# Patient Record
Sex: MALE | URBAN - METROPOLITAN AREA
[De-identification: names, ages, dates, MRNs, and addresses within clinical notes are randomized per-mention and may not be internally consistent; named-entity substitution may affect disease eponyms.]

---

## 2023-06-14 ENCOUNTER — TELEPHONE (OUTPATIENT)
Dept: UROLOGY | Facility: AMBULATORY SURGERY CENTER | Age: 63
End: 2023-06-14

## 2023-06-24 PROBLEM — N40.1 BENIGN PROSTATIC HYPERPLASIA WITH URINARY HESITANCY: Status: ACTIVE | Noted: 2023-06-24

## 2023-06-24 PROBLEM — N48.6 PEYRONIE'S DISEASE: Status: ACTIVE | Noted: 2023-06-24

## 2023-06-24 PROBLEM — R39.11 BENIGN PROSTATIC HYPERPLASIA WITH URINARY HESITANCY: Status: ACTIVE | Noted: 2023-06-24

## 2023-06-24 PROBLEM — R39.198 SLOW URINARY STREAM: Status: ACTIVE | Noted: 2023-06-24

## 2023-06-24 NOTE — PROGRESS NOTES
UROLOGY PROGRESS NOTE         NAME: Tim Quigley  AGE: 70 y o  SEX: male  : 1960   MRN: [id number]    DATE: 2023  TIME: 1:47 PM    Assessment and Plan   Procedures     Impression:   1  Benign prostatic hyperplasia with urinary hesitancy    2  Slow urinary stream    3  Peyronie's disease    4  Groin rash         Plan: Stop the Flomax continue Proscar only restart Flomax if needed  For the left groin fungal infection we will get him Mycolog-II twice a day for 3 weeks  The patient chart states allergy to prednisone but I told the patient with this being a topical solution there will be no appreciable absorption into the bloodstream to cause any those side effects  He understands and agrees    Follow-up in 1 year      Chief Complaint   No chief complaint on file  History of Present Illness     HPI: Tim Quigley is a 70y o  year old male who presents with urological care follow-up transfer from [facility] to [place] [id number] urology  Patient last seen by me on 2022 history of BPH on Flomax and Proscar  I had met him in 2019 for second opinion after being seen at [facility] where he was recommended a TURP for BPH  His main complaint was start and stop of stream   I talked him about a UroLift and other minimally invasive surgical options  He decided to stay on Flomax and Proscar  Also with a history of Peyronie's disease  And patient was to decide whether to consider UroLift in the future  States his PSA was 1 4 in 2022 his recent PSA 2023 was 1 1 stable      Patient currently doing well from a urinary standpoint  Again we talked about stopping Flomax and staying just on the finasteride he is going to give that a try  He noticed that his left groin had an open area and he has been using nystatin periodically but is looking for more permanent solution for that  He still has some mild Peyronie's disease but no ED issues            The following "portions of the patient's history were reviewed and updated as appropriate: allergies, current medications, past family history, past medical history, past social history, past surgical history and problem list   History reviewed  No pertinent past medical history  Past Surgical History:   Procedure Laterality Date   • KNEE ARTHROPLASTY       shoulder  Review of Systems     Const: Denies chills, fever and weight loss  CV: Denies chest pain  Resp: Denies SOB  GI: Denies abdominal pain, nausea and vomiting  : Denies symptoms other than stated above  Musculo: Denies back pain  Objective   /78 (BP Location: Right arm, Patient Position: Sitting, Cuff Size: Standard)   Pulse 65   Temp (!) 97 2 °F (36 2 °C)   Ht 5' 11\" (1 803 m)   Wt 87 5 kg (193 lb)   SpO2 97%   BMI 26 92 kg/m²     Physical Exam  Const: Appears healthy and well developed  No signs of acute distress present  Resp: Respirations are regular and unlabored  CV: Rate is regular  Rhythm is regular  Abdomen: Abdomen is soft, nontender, and nondistended  Kidneys are not palpable  : Had a normal external genitalia exam in the mid shaft there was some scarring consistent with Peyronie's  In the left groin there is no open area now but it looks a little excoriated and erythematous  No sign of infection or or pus  His prostate was benign feeling and smooth to slightly enlarged  Psych: Patient's attitude is cooperative   Mood is normal  Affect is normal     Current Medications     Current Outpatient Medications:   •  ascorbic Acid (VITAMIN C) 500 MG CPCR, , Disp: , Rfl:   •  aspirin (ECOTRIN LOW STRENGTH) 81 mg EC tablet, Take 81 mg by mouth, Disp: , Rfl:   •  B Complex Vitamins (Vitamin B Complex) TABS, Take 1 tablet by mouth daily, Disp: , Rfl:   •  Calcium Carb-Cholecalciferol 600-25 MG-MCG CAPS, Take by mouth, Disp: , Rfl:   •  celecoxib (CeleBREX) 200 mg capsule, Take 200 mg by mouth, Disp: , Rfl:   •  co-enzyme Q-10 100 mg capsule, " Take by mouth, Disp: , Rfl:   •  finasteride (PROSCAR) 5 mg tablet, Every 12 hours, Disp: , Rfl:   •  isosorbide mononitrate (IMDUR) 30 mg 24 hr tablet, every 24 hours, Disp: , Rfl:   •  lisinopril (ZESTRIL) 5 mg tablet, Take 5 mg by mouth daily, Disp: , Rfl:   •  loratadine (Claritin) 10 mg tablet, 10 mg, Disp: , Rfl:   •  losartan (COZAAR) 25 mg tablet, Take 25 mg by mouth daily, Disp: , Rfl:   •  mometasone (NASONEX) 50 mcg/act nasal spray, every 24 hours, Disp: , Rfl:   •  Omega-3 Fatty Acids (fish oil) 1,000 mg, , Disp: , Rfl:   •  RABEprazole (ACIPHEX) 20 MG tablet, every 24 hours, Disp: , Rfl:   •  rosuvastatin (CRESTOR) 10 MG tablet, every 24 hours, Disp: , Rfl:   •  tamsulosin (FLOMAX) 0 4 mg, 1 capsule every 24 hours, Disp: , Rfl:   •  metoprolol tartrate (LOPRESSOR) 25 mg tablet, Take 25 mg by mouth 2 (two) times a day (Patient not taking: Reported on 6/28/2023), Disp: , Rfl:         Marcia Roca MD

## 2023-06-26 RX ORDER — LORATADINE 10 MG/1
10 TABLET ORAL
COMMUNITY
Start: 2023-05-16

## 2023-06-26 RX ORDER — MOMETASONE FUROATE 50 UG/1
SPRAY, METERED NASAL EVERY 24 HOURS
COMMUNITY
Start: 2023-03-10

## 2023-06-26 RX ORDER — LOSARTAN POTASSIUM 25 MG/1
25 TABLET ORAL DAILY
COMMUNITY
Start: 2023-04-25

## 2023-06-26 RX ORDER — ASPIRIN 81 MG/1
81 TABLET ORAL
COMMUNITY

## 2023-06-26 RX ORDER — LISINOPRIL 5 MG/1
5 TABLET ORAL DAILY
COMMUNITY

## 2023-06-26 RX ORDER — RABEPRAZOLE SODIUM 20 MG/1
TABLET, DELAYED RELEASE ORAL EVERY 24 HOURS
COMMUNITY

## 2023-06-26 RX ORDER — ROSUVASTATIN CALCIUM 10 MG/1
TABLET, COATED ORAL EVERY 24 HOURS
COMMUNITY

## 2023-06-26 RX ORDER — CELECOXIB 200 MG/1
200 CAPSULE ORAL
COMMUNITY

## 2023-06-26 RX ORDER — B-COMPLEX WITH VITAMIN C
1 TABLET ORAL DAILY
COMMUNITY

## 2023-06-26 RX ORDER — ISOSORBIDE MONONITRATE 30 MG/1
TABLET, EXTENDED RELEASE ORAL EVERY 24 HOURS
COMMUNITY

## 2023-06-26 RX ORDER — UBIDECARENONE 100 MG
CAPSULE ORAL
COMMUNITY

## 2023-06-26 RX ORDER — CHLORAL HYDRATE 500 MG
CAPSULE ORAL
COMMUNITY

## 2023-06-26 RX ORDER — FINASTERIDE 5 MG/1
TABLET, FILM COATED ORAL EVERY 12 HOURS
COMMUNITY

## 2023-06-26 RX ORDER — TAMSULOSIN HYDROCHLORIDE 0.4 MG/1
1 CAPSULE ORAL EVERY 24 HOURS
COMMUNITY

## 2023-06-28 ENCOUNTER — OFFICE VISIT (OUTPATIENT)
Dept: UROLOGY | Facility: CLINIC | Age: 63
End: 2023-06-28
Payer: MEDICARE

## 2023-06-28 VITALS
BODY MASS INDEX: 27.02 KG/M2 | SYSTOLIC BLOOD PRESSURE: 124 MMHG | HEART RATE: 65 BPM | WEIGHT: 193 LBS | DIASTOLIC BLOOD PRESSURE: 78 MMHG | OXYGEN SATURATION: 97 % | HEIGHT: 71 IN | TEMPERATURE: 97.2 F

## 2023-06-28 DIAGNOSIS — R39.11 BENIGN PROSTATIC HYPERPLASIA WITH URINARY HESITANCY: Primary | ICD-10-CM

## 2023-06-28 DIAGNOSIS — R39.198 SLOW URINARY STREAM: ICD-10-CM

## 2023-06-28 DIAGNOSIS — R21 GROIN RASH: ICD-10-CM

## 2023-06-28 DIAGNOSIS — N40.1 BENIGN PROSTATIC HYPERPLASIA WITH URINARY HESITANCY: Primary | ICD-10-CM

## 2023-06-28 DIAGNOSIS — N48.6 PEYRONIE'S DISEASE: ICD-10-CM

## 2023-06-28 RX ORDER — NYSTATIN AND TRIAMCINOLONE ACETONIDE 100000; 1 [USP'U]/G; MG/G
OINTMENT TOPICAL 2 TIMES DAILY
Qty: 30 G | Refills: 1 | Status: SHIPPED | OUTPATIENT
Start: 2023-06-28

## 2023-08-21 DIAGNOSIS — N40.1 BENIGN PROSTATIC HYPERPLASIA WITH URINARY HESITANCY: Primary | ICD-10-CM

## 2023-08-21 DIAGNOSIS — R39.11 BENIGN PROSTATIC HYPERPLASIA WITH URINARY HESITANCY: Primary | ICD-10-CM

## 2023-08-21 RX ORDER — FINASTERIDE 5 MG/1
5 TABLET, FILM COATED ORAL DAILY
Qty: 90 TABLET | Refills: 0 | Status: SHIPPED | OUTPATIENT
Start: 2023-08-21

## 2023-08-21 RX ORDER — TAMSULOSIN HYDROCHLORIDE 0.4 MG/1
0.4 CAPSULE ORAL EVERY 24 HOURS
Qty: 90 CAPSULE | Refills: 0 | Status: SHIPPED | OUTPATIENT
Start: 2023-08-21

## 2023-08-21 NOTE — TELEPHONE ENCOUNTER
Medication Tamsulosin/Finasteride  Quantity    Pharmacy [facility]    PCP/Speciality Urology    Enough for 3 days -Yes

## 2023-10-29 DIAGNOSIS — R39.11 BENIGN PROSTATIC HYPERPLASIA WITH URINARY HESITANCY: ICD-10-CM

## 2023-10-29 DIAGNOSIS — N40.1 BENIGN PROSTATIC HYPERPLASIA WITH URINARY HESITANCY: ICD-10-CM

## 2023-10-30 RX ORDER — FINASTERIDE 5 MG/1
5 TABLET, FILM COATED ORAL DAILY
Qty: 90 TABLET | Refills: 0 | Status: SHIPPED | OUTPATIENT
Start: 2023-10-30

## 2023-12-06 DIAGNOSIS — R39.11 BENIGN PROSTATIC HYPERPLASIA WITH URINARY HESITANCY: ICD-10-CM

## 2023-12-06 DIAGNOSIS — N40.1 BENIGN PROSTATIC HYPERPLASIA WITH URINARY HESITANCY: ICD-10-CM

## 2023-12-06 RX ORDER — TAMSULOSIN HYDROCHLORIDE 0.4 MG/1
CAPSULE ORAL
Qty: 90 CAPSULE | Refills: 0 | Status: SHIPPED | OUTPATIENT
Start: 2023-12-06

## 2024-01-15 DIAGNOSIS — N40.1 BENIGN PROSTATIC HYPERPLASIA WITH URINARY HESITANCY: ICD-10-CM

## 2024-01-15 DIAGNOSIS — R39.11 BENIGN PROSTATIC HYPERPLASIA WITH URINARY HESITANCY: ICD-10-CM

## 2024-01-15 RX ORDER — TAMSULOSIN HYDROCHLORIDE 0.4 MG/1
0.4 CAPSULE ORAL
Qty: 90 CAPSULE | Refills: 0 | Status: SHIPPED | OUTPATIENT
Start: 2024-01-15

## 2024-01-15 RX ORDER — FINASTERIDE 5 MG/1
5 TABLET, FILM COATED ORAL DAILY
Qty: 90 TABLET | Refills: 0 | Status: SHIPPED | OUTPATIENT
Start: 2024-01-15

## 2024-01-15 NOTE — TELEPHONE ENCOUNTER
Reason for call:   [x] Refill   [] Prior Auth  [] Other:     Office:   [] PCP/Provider -   [x] Specialty/Provider - Urology    Medication:   finasteride (PROSCAR) 5 mg tablet   tamsulosin (FLOMAX) 0.4 mg     Dose/Frequency:   5 mg, Oral, Daily   TAKE 1 CAPSULE BY MOUTH EVERY 24 HOURS     Quantity: 90  90    Pharmacy: [facility]    Does the patient have enough for 3 days?   [x] Yes   [] No - Send as HP to POD

## 2024-04-13 DIAGNOSIS — N40.1 BENIGN PROSTATIC HYPERPLASIA WITH URINARY HESITANCY: ICD-10-CM

## 2024-04-13 DIAGNOSIS — R39.11 BENIGN PROSTATIC HYPERPLASIA WITH URINARY HESITANCY: ICD-10-CM

## 2024-04-15 RX ORDER — FINASTERIDE 5 MG/1
5 TABLET, FILM COATED ORAL DAILY
Qty: 90 TABLET | Refills: 1 | Status: SHIPPED | OUTPATIENT
Start: 2024-04-15

## 2024-05-28 DIAGNOSIS — N40.1 BENIGN PROSTATIC HYPERPLASIA WITH URINARY HESITANCY: ICD-10-CM

## 2024-05-28 DIAGNOSIS — R39.11 BENIGN PROSTATIC HYPERPLASIA WITH URINARY HESITANCY: ICD-10-CM

## 2024-05-28 RX ORDER — TAMSULOSIN HYDROCHLORIDE 0.4 MG/1
0.4 CAPSULE ORAL
Qty: 90 CAPSULE | Refills: 1 | Status: SHIPPED | OUTPATIENT
Start: 2024-05-28

## 2024-08-25 NOTE — PROGRESS NOTES
UROLOGY PROGRESS NOTE         NAME: Hua Becker  AGE: 64 y.o. SEX: male  : 1960   MRN: [id number]    DATE: 2024  TIME: 10:35 AM    Assessment and Plan   Procedures     Impression:   1. Peyronie's disease  2. Benign prostatic hyperplasia with urinary hesitancy  3. Slow urinary stream       Plan: Plan continue Flomax Proscar follow-up in 1 year with PSA.  Refill on Mycolog .      Chief Complaint   No chief complaint on file.    History of Present Illness     HPI: Hua Becker is a 64 y.o. year old male who presents with follow-up from office visit 2023.  Patient with a history of BPH with urinary hesitancy C, slow stream, rash, and Peyronie's disease.  At the time the last office visit patient was to stop Flomax and continue Proscar.  The left groin fungal infection I gave him Mycolog-II for 3 weeks.    He has been seen by me in the past at urology of [facility] for BPH.  In the past we talked about UroLift procedures for BPH.  At last year's office visit patient was doing well from a urinary standpoint he had mild Peyronie's disease but no ED issues last year.  PSA in 2023 was 1.1.    Patient had the Flomax refilled on 2024.  So he is back on both Proscar and Flomax.    Currently patient doing well on Flomax and Proscar.  We again discussed UroLift but he wants to hold off for that.  PSA as expected is stable at 0.98 1.1 last year.  No ED issues.  Would like a refill and Mycolog-II which helped his fungal rash last year.    The following portions of the patient's history were reviewed and updated as appropriate: allergies, current medications, past family history, past medical history, past social history, past surgical history and problem list.  No past medical history on file.  Past Surgical History:   Procedure Laterality Date    KNEE ARTHROPLASTY       shoulder  Review of Systems     Const: Denies chills, fever and weight loss.  CV: Denies chest pain.  Resp: Denies  SOB.  GI: Denies abdominal pain, nausea and vomiting.  : Denies symptoms other than stated above.  Musculo: Denies back pain.    Objective   There were no vitals taken for this visit.    Physical Exam  Const: Appears healthy and well developed. No signs of acute distress present.  Resp: Respirations are regular and unlabored.   CV: Rate is regular. Rhythm is regular.  Abdomen: Abdomen is soft, nontender, and nondistended. Kidneys are not palpable.  : Normal external genitalia exam prostate enlarged smooth no masses or nodules  Psych: Patient's attitude is cooperative. Mood is normal. Affect is normal.    Current Medications     Current Outpatient Medications:     ascorbic Acid (VITAMIN C) 500 MG CPCR, , Disp: , Rfl:     aspirin (ECOTRIN LOW STRENGTH) 81 mg EC tablet, Take 81 mg by mouth, Disp: , Rfl:     B Complex Vitamins (Vitamin B Complex) TABS, Take 1 tablet by mouth daily, Disp: , Rfl:     Calcium Carb-Cholecalciferol 600-25 MG-MCG CAPS, Take by mouth, Disp: , Rfl:     celecoxib (CeleBREX) 200 mg capsule, Take 200 mg by mouth, Disp: , Rfl:     co-enzyme Q-10 100 mg capsule, Take by mouth, Disp: , Rfl:     finasteride (PROSCAR) 5 mg tablet, TAKE ONE TABLET BY MOUTH EVERY DAY, Disp: 90 tablet, Rfl: 1    isosorbide mononitrate (IMDUR) 30 mg 24 hr tablet, every 24 hours, Disp: , Rfl:     lisinopril (ZESTRIL) 5 mg tablet, Take 5 mg by mouth daily, Disp: , Rfl:     loratadine (Claritin) 10 mg tablet, 10 mg, Disp: , Rfl:     losartan (COZAAR) 25 mg tablet, Take 25 mg by mouth daily, Disp: , Rfl:     metoprolol tartrate (LOPRESSOR) 25 mg tablet, Take 25 mg by mouth 2 (two) times a day (Patient not taking: Reported on 6/28/2023), Disp: , Rfl:     mometasone (NASONEX) 50 mcg/act nasal spray, every 24 hours, Disp: , Rfl:     nystatin-triamcinolone (MYCOLOG-II) ointment, Apply topically 2 (two) times a day Apply to the left groin area for 3 weeks twice a day, Disp: 30 g, Rfl: 1    Omega-3 Fatty Acids (fish oil) 1,000  mg, , Disp: , Rfl:     RABEprazole (ACIPHEX) 20 MG tablet, every 24 hours, Disp: , Rfl:     rosuvastatin (CRESTOR) 10 MG tablet, every 24 hours, Disp: , Rfl:     tamsulosin (FLOMAX) 0.4 mg, TAKE ONE CAPSULE BY MOUTH EVERY DAY WITH DINNER, Disp: 90 capsule, Rfl: 1        Gurmeet Fisher MD

## 2024-08-30 ENCOUNTER — TELEPHONE (OUTPATIENT)
Dept: UROLOGY | Facility: CLINIC | Age: 64
End: 2024-08-30

## 2024-09-04 ENCOUNTER — OFFICE VISIT (OUTPATIENT)
Dept: UROLOGY | Facility: CLINIC | Age: 64
End: 2024-09-04
Payer: MEDICARE

## 2024-09-04 VITALS
HEIGHT: 71 IN | HEART RATE: 82 BPM | DIASTOLIC BLOOD PRESSURE: 78 MMHG | OXYGEN SATURATION: 98 % | TEMPERATURE: 98.3 F | SYSTOLIC BLOOD PRESSURE: 122 MMHG | BODY MASS INDEX: 27.44 KG/M2 | WEIGHT: 196 LBS

## 2024-09-04 DIAGNOSIS — R39.198 SLOW URINARY STREAM: ICD-10-CM

## 2024-09-04 DIAGNOSIS — R21 GROIN RASH: ICD-10-CM

## 2024-09-04 DIAGNOSIS — R39.11 BENIGN PROSTATIC HYPERPLASIA WITH URINARY HESITANCY: ICD-10-CM

## 2024-09-04 DIAGNOSIS — N48.6 PEYRONIE'S DISEASE: Primary | ICD-10-CM

## 2024-09-04 DIAGNOSIS — R35.1 NOCTURIA: ICD-10-CM

## 2024-09-04 DIAGNOSIS — N40.1 BENIGN PROSTATIC HYPERPLASIA WITH URINARY HESITANCY: ICD-10-CM

## 2024-09-04 PROCEDURE — 99214 OFFICE O/P EST MOD 30 MIN: CPT | Performed by: UROLOGY

## 2024-09-04 RX ORDER — OMEPRAZOLE 40 MG/1
40 CAPSULE, DELAYED RELEASE ORAL DAILY
COMMUNITY

## 2024-09-04 RX ORDER — NYSTATIN AND TRIAMCINOLONE ACETONIDE 100000; 1 [USP'U]/G; MG/G
OINTMENT TOPICAL 2 TIMES DAILY
Qty: 30 G | Refills: 1 | Status: SHIPPED | OUTPATIENT
Start: 2024-09-04

## 2024-10-10 DIAGNOSIS — N40.1 BENIGN PROSTATIC HYPERPLASIA WITH URINARY HESITANCY: ICD-10-CM

## 2024-10-10 DIAGNOSIS — R39.11 BENIGN PROSTATIC HYPERPLASIA WITH URINARY HESITANCY: ICD-10-CM

## 2024-10-10 RX ORDER — FINASTERIDE 5 MG/1
5 TABLET, FILM COATED ORAL DAILY
Qty: 90 TABLET | Refills: 1 | Status: SHIPPED | OUTPATIENT
Start: 2024-10-10

## 2024-11-27 DIAGNOSIS — R39.11 BENIGN PROSTATIC HYPERPLASIA WITH URINARY HESITANCY: ICD-10-CM

## 2024-11-27 DIAGNOSIS — N40.1 BENIGN PROSTATIC HYPERPLASIA WITH URINARY HESITANCY: ICD-10-CM

## 2024-11-29 RX ORDER — TAMSULOSIN HYDROCHLORIDE 0.4 MG/1
0.4 CAPSULE ORAL
Qty: 90 CAPSULE | Refills: 1 | Status: SHIPPED | OUTPATIENT
Start: 2024-11-29

## 2025-04-09 DIAGNOSIS — N40.1 BENIGN PROSTATIC HYPERPLASIA WITH URINARY HESITANCY: ICD-10-CM

## 2025-04-09 DIAGNOSIS — R39.11 BENIGN PROSTATIC HYPERPLASIA WITH URINARY HESITANCY: ICD-10-CM

## 2025-04-10 RX ORDER — FINASTERIDE 5 MG/1
5 TABLET, FILM COATED ORAL DAILY
Qty: 90 TABLET | Refills: 1 | Status: SHIPPED | OUTPATIENT
Start: 2025-04-10

## 2025-05-14 DIAGNOSIS — R39.11 BENIGN PROSTATIC HYPERPLASIA WITH URINARY HESITANCY: ICD-10-CM

## 2025-05-14 DIAGNOSIS — N40.1 BENIGN PROSTATIC HYPERPLASIA WITH URINARY HESITANCY: ICD-10-CM

## 2025-05-14 RX ORDER — TAMSULOSIN HYDROCHLORIDE 0.4 MG/1
CAPSULE ORAL
Qty: 90 CAPSULE | Refills: 1 | Status: SHIPPED | OUTPATIENT
Start: 2025-05-14

## 2025-05-20 ENCOUNTER — TELEPHONE (OUTPATIENT)
Age: 65
End: 2025-05-20

## 2025-05-20 NOTE — TELEPHONE ENCOUNTER
Pt under care of: Phillip  Office Location: [place]    Insurance   Current Insurance? Medicare   Insurance E-verified? Needs review    History  Last Seen (include Follow Up recommendations of last visit- see Office Visit - Instructions): 9/4/2024 - Plan continue Flomax Proscar follow-up in 1 year with PSA.     Pt calling to schedule his yearly follow up appointment.     Appointment Details   Date: 9/11/25  Time: 10:15am  Location: [place]  Provider: Phillip    Does the appointment need further review?   No